# Patient Record
Sex: FEMALE | Race: WHITE | NOT HISPANIC OR LATINO | Employment: FULL TIME | RURAL
[De-identification: names, ages, dates, MRNs, and addresses within clinical notes are randomized per-mention and may not be internally consistent; named-entity substitution may affect disease eponyms.]

---

## 2020-06-22 ENCOUNTER — HISTORICAL (OUTPATIENT)
Dept: ADMINISTRATIVE | Facility: HOSPITAL | Age: 20
End: 2020-06-22

## 2020-06-22 LAB
HBV SURFACE AB SER-ACNC: NORMAL M[IU]/ML
HBV SURFACE AG SERPL QL IA: NORMAL

## 2020-06-24 LAB
VARICELLA ZOSTER, BLOOD: POSITIVE
VZV IGG INDEX: 1.33 (ref 0–0.9)

## 2020-12-07 ENCOUNTER — HISTORICAL (OUTPATIENT)
Dept: ADMINISTRATIVE | Facility: HOSPITAL | Age: 20
End: 2020-12-07

## 2020-12-07 LAB — SARS-COV+SARS-COV-2 AG RESP QL IA.RAPID: POSITIVE

## 2020-12-28 ENCOUNTER — HISTORICAL (OUTPATIENT)
Dept: ADMINISTRATIVE | Facility: HOSPITAL | Age: 20
End: 2020-12-28

## 2023-11-14 ENCOUNTER — HOSPITAL ENCOUNTER (EMERGENCY)
Facility: HOSPITAL | Age: 23
Discharge: HOME | End: 2023-11-14
Payer: MEDICAID

## 2023-11-14 VITALS
HEART RATE: 62 BPM | RESPIRATION RATE: 12 BRPM | BODY MASS INDEX: 27.82 KG/M2 | DIASTOLIC BLOOD PRESSURE: 70 MMHG | OXYGEN SATURATION: 98 % | HEIGHT: 65 IN | WEIGHT: 167 LBS | TEMPERATURE: 98 F | SYSTOLIC BLOOD PRESSURE: 110 MMHG

## 2023-11-14 DIAGNOSIS — R05.9 COUGH: ICD-10-CM

## 2023-11-14 DIAGNOSIS — J02.0 STREP PHARYNGITIS: Primary | ICD-10-CM

## 2023-11-14 LAB
FLUAV AG UPPER RESP QL IA.RAPID: NEGATIVE
FLUBV AG UPPER RESP QL IA.RAPID: NEGATIVE
HCG UR QL IA.RAPID: NEGATIVE
RAPID GROUP A STREP: POSITIVE
SARS-COV-2 RDRP RESP QL NAA+PROBE: NEGATIVE

## 2023-11-14 PROCEDURE — 87804 INFLUENZA ASSAY W/OPTIC: CPT | Performed by: NURSE PRACTITIONER

## 2023-11-14 PROCEDURE — 87635 SARS-COV-2 COVID-19 AMP PRB: CPT | Performed by: NURSE PRACTITIONER

## 2023-11-14 PROCEDURE — 99284 EMERGENCY DEPT VISIT MOD MDM: CPT | Mod: ,,, | Performed by: NURSE PRACTITIONER

## 2023-11-14 PROCEDURE — 87880 STREP A ASSAY W/OPTIC: CPT | Performed by: NURSE PRACTITIONER

## 2023-11-14 PROCEDURE — 99284 EMERGENCY DEPT VISIT MOD MDM: CPT | Mod: 25

## 2023-11-14 PROCEDURE — 81025 URINE PREGNANCY TEST: CPT | Performed by: NURSE PRACTITIONER

## 2023-11-14 PROCEDURE — 99284 PR EMERGENCY DEPT VISIT,LEVEL IV: ICD-10-PCS | Mod: ,,, | Performed by: NURSE PRACTITIONER

## 2023-11-14 RX ORDER — PROMETHAZINE HYDROCHLORIDE AND DEXTROMETHORPHAN HYDROBROMIDE 6.25; 15 MG/5ML; MG/5ML
5 SYRUP ORAL EVERY 6 HOURS PRN
Qty: 118 ML | Refills: 0 | Status: SHIPPED | OUTPATIENT
Start: 2023-11-14 | End: 2023-11-24

## 2023-11-14 RX ORDER — AMOXICILLIN AND CLAVULANATE POTASSIUM 875; 125 MG/1; MG/1
1 TABLET, FILM COATED ORAL EVERY 12 HOURS
Qty: 20 TABLET | Refills: 0 | Status: SHIPPED | OUTPATIENT
Start: 2023-11-14 | End: 2023-11-24

## 2023-11-14 NOTE — ED TRIAGE NOTES
Pt presents to the ED via POV w/ c/o body aches, HA, fever, and cough x 1 day. Pt states that her 3 kids have been both positive for strep as well as flu this week. Pt states she had a 102 fever this morning at 7 am and took tylenol for it.

## 2023-11-14 NOTE — ED PROVIDER NOTES
Encounter Date: 2023       History     Chief Complaint   Patient presents with    Fever    Headache    Cough    Generalized Body Aches     23 yo WF ambulatory to ED with c/o fever 102 this morning, productive cough with green sputum, myalgia. Had diarrhea yesterday - none today. No c/o abdominal pain. Kids have been sick with flu and strep.     The history is provided by the patient.     Review of patient's allergies indicates:   Allergen Reactions    Stadol [butorphanol] Itching     History reviewed. No pertinent past medical history.  Past Surgical History:   Procedure Laterality Date     SECTION      ENDOMETRIAL ABLATION       History reviewed. No pertinent family history.  Social History     Tobacco Use    Smoking status: Never    Smokeless tobacco: Never   Substance Use Topics    Drug use: Yes     Frequency: 3.0 times per week     Types: Marijuana     Review of Systems   Constitutional:  Positive for chills and fever. Negative for appetite change.   HENT:  Negative for congestion, ear pain and sore throat.    Respiratory:  Positive for cough. Negative for shortness of breath and wheezing.    Cardiovascular:  Negative for chest pain, palpitations and leg swelling.   Gastrointestinal:  Positive for diarrhea. Negative for abdominal pain, nausea and vomiting.   Genitourinary:  Negative for dysuria.   Musculoskeletal:  Negative for neck pain and neck stiffness.   Neurological:  Positive for headaches. Negative for dizziness and weakness.   Psychiatric/Behavioral:  Negative for confusion.    All other systems reviewed and are negative.      Physical Exam     Initial Vitals [23 0817]   BP Pulse Resp Temp SpO2   110/70 62 12 98.3 °F (36.8 °C) 98 %      MAP       --         Physical Exam    Nursing note and vitals reviewed.  Constitutional: She appears well-developed and well-nourished.   HENT:   Head: Normocephalic and atraumatic.   Right Ear: External ear normal.   Left Ear: External ear normal.    Nose: Nose normal.   Mouth/Throat: Oropharynx is clear and moist.   Eyes: EOM are normal. Pupils are equal, round, and reactive to light.   Neck: Neck supple.   Normal range of motion.  Cardiovascular:  Normal rate, regular rhythm and normal heart sounds.           Pulmonary/Chest: Breath sounds normal.   Abdominal: Abdomen is soft. Bowel sounds are normal. She exhibits no distension. There is no abdominal tenderness.   Musculoskeletal:         General: Normal range of motion.      Cervical back: Normal range of motion and neck supple.     Neurological: She is alert and oriented to person, place, and time. She has normal strength. GCS score is 15. GCS eye subscore is 4. GCS verbal subscore is 5. GCS motor subscore is 6.   Skin: Skin is warm. Capillary refill takes less than 2 seconds.   Psychiatric: She has a normal mood and affect. Her behavior is normal. Judgment and thought content normal.         Medical Screening Exam   See Full Note    ED Course   Procedures  Labs Reviewed   THROAT SCREEN, RAPID STREP - Abnormal; Notable for the following components:       Result Value    Rapid Group A Strep Positive (*)     All other components within normal limits   RAPID INFLUENZA A/B - Normal   SARS-COV-2 RNA AMPLIFICATION, QUAL - Normal    Narrative:     Negative SARS-CoV results should not be used as the sole basis for treatment or patient management decisions; negative results should be considered in the context of a patient's recent exposures, history and the presene of clinical signs and symptoms consistent with COVID-19.  Negative results should be treated as presumptive and confirmed by molecular assay, if necessary for patient management.   HCG QUALITATIVE URINE - Normal          Imaging Results              X-Ray Chest AP Portable (Final result)  Result time 11/14/23 08:46:03      Final result by Valdo Blake II, MD (11/14/23 08:46:03)                   Impression:      No evidence of cardiopulmonary  disease.      Electronically signed by: Valdo Blake  Date:    11/14/2023  Time:    08:46               Narrative:    EXAMINATION:  XR CHEST AP PORTABLE    CLINICAL HISTORY:  Cough, unspecified    COMPARISON:  None available    TECHNIQUE:  XR CHEST AP PORTABLE    FINDINGS:  The heart and mediastinum are normal in size and configuration.  The pulmonary vascularity is normal in caliber.  No lung infiltrates, effusions, pneumothorax or other abnormality is demonstrated.                                       Medications - No data to display  Medical Decision Making  21 yo WF ambulatory to ED with c/o fever 102 this morning, productive cough with green sputum, myalgia. Had diarrhea yesterday - none today. No c/o abdominal pain. Kids have been sick with flu and strep.     Vitals reviewed  Lab positive for strep  Patient discharged with Augmentin and cough medication  Strict return and follow-up precautions have been given by me personally to the patient/family/caregiver(s).    Data Reviewed/Counseling: I have reviewed the patient's vital signs, nursing notes, and other relevant tests/information. I had a detailed discussion regarding the historical points, exam findings, and any diagnostic results supporting the discharge diagnosis. I also discussed the need for outpatient follow-up and the need to return to the ED if symptoms worsen or if there are any questions or concerns that arise at home.        Amount and/or Complexity of Data Reviewed  Labs: ordered. Decision-making details documented in ED Course.  Radiology: ordered. Decision-making details documented in ED Course.    Risk  Prescription drug management.                               Clinical Impression:   Final diagnoses:  [R05.9] Cough  [J02.0] Strep pharyngitis (Primary)                      Danielle Aparicio, CHRISTY  11/14/23 0900

## 2023-11-14 NOTE — DISCHARGE INSTRUCTIONS
Take all of your antibiotics as prescribed, even if you are feeling better. Drink plenty of fluids. Tylenol and Ibuprofen as needed for fever.

## 2023-11-14 NOTE — Clinical Note
"Hilda"Evans Horn was seen and treated in our emergency department on 11/14/2023.  She may return to work on 11/16/2023.       If you have any questions or concerns, please don't hesitate to call.      Danielle Aparicio, OSCARP"

## 2024-04-13 ENCOUNTER — HOSPITAL ENCOUNTER (EMERGENCY)
Facility: HOSPITAL | Age: 24
Discharge: HOME | End: 2024-04-13
Payer: MEDICAID

## 2024-04-13 VITALS
TEMPERATURE: 98 F | WEIGHT: 177.81 LBS | HEART RATE: 50 BPM | RESPIRATION RATE: 14 BRPM | OXYGEN SATURATION: 97 % | DIASTOLIC BLOOD PRESSURE: 66 MMHG | BODY MASS INDEX: 29.62 KG/M2 | SYSTOLIC BLOOD PRESSURE: 115 MMHG | HEIGHT: 65 IN

## 2024-04-13 DIAGNOSIS — R51.9 FRONTAL HEADACHE: Primary | ICD-10-CM

## 2024-04-13 PROCEDURE — 96374 THER/PROPH/DIAG INJ IV PUSH: CPT

## 2024-04-13 PROCEDURE — 63600175 PHARM REV CODE 636 W HCPCS: Performed by: NURSE PRACTITIONER

## 2024-04-13 PROCEDURE — 99284 EMERGENCY DEPT VISIT MOD MDM: CPT | Mod: ,,, | Performed by: NURSE PRACTITIONER

## 2024-04-13 PROCEDURE — 99285 EMERGENCY DEPT VISIT HI MDM: CPT | Mod: 25

## 2024-04-13 PROCEDURE — 96375 TX/PRO/DX INJ NEW DRUG ADDON: CPT

## 2024-04-13 RX ORDER — KETOROLAC TROMETHAMINE 30 MG/ML
30 INJECTION, SOLUTION INTRAMUSCULAR; INTRAVENOUS
Status: COMPLETED | OUTPATIENT
Start: 2024-04-13 | End: 2024-04-13

## 2024-04-13 RX ORDER — DIPHENHYDRAMINE HYDROCHLORIDE 50 MG/ML
25 INJECTION INTRAMUSCULAR; INTRAVENOUS
Status: COMPLETED | OUTPATIENT
Start: 2024-04-13 | End: 2024-04-13

## 2024-04-13 RX ORDER — PROCHLORPERAZINE EDISYLATE 5 MG/ML
10 INJECTION INTRAMUSCULAR; INTRAVENOUS
Status: COMPLETED | OUTPATIENT
Start: 2024-04-13 | End: 2024-04-13

## 2024-04-13 RX ORDER — ONDANSETRON 4 MG/1
4 TABLET, ORALLY DISINTEGRATING ORAL EVERY 6 HOURS PRN
Qty: 15 TABLET | Refills: 0 | Status: SHIPPED | OUTPATIENT
Start: 2024-04-13

## 2024-04-13 RX ADMIN — PROCHLORPERAZINE EDISYLATE 10 MG: 5 INJECTION INTRAMUSCULAR; INTRAVENOUS at 08:04

## 2024-04-13 RX ADMIN — KETOROLAC TROMETHAMINE 30 MG: 30 INJECTION, SOLUTION INTRAMUSCULAR at 08:04

## 2024-04-13 RX ADMIN — DIPHENHYDRAMINE HYDROCHLORIDE 25 MG: 50 INJECTION INTRAMUSCULAR; INTRAVENOUS at 08:04

## 2024-04-13 NOTE — ED PROVIDER NOTES
Encounter Date: 2024       History     Chief Complaint   Patient presents with    Headache     Since thursday     24 y/o WF presents pov with c/o a headache x 2 days. Has h/o frequent headaches in the past and was schedule for CT of head but didn't follow through. Headache is frontal with pain rating 8/10 and associated nausea. Patient had extensive dental work just prior to onset of headache including extraction of three wisdom teeth and two molars. Patient has been self-treating with Tylenol and Ibuprofen with no relief. Negative for fever/chills. LMP a week ago.        Review of patient's allergies indicates:   Allergen Reactions    Stadol [butorphanol] Itching     No past medical history on file.  Past Surgical History:   Procedure Laterality Date     SECTION      ENDOMETRIAL ABLATION       No family history on file.  Social History     Tobacco Use    Smoking status: Never    Smokeless tobacco: Never   Substance Use Topics    Drug use: Yes     Frequency: 3.0 times per week     Types: Marijuana     Review of Systems   Constitutional:  Negative for chills and fever.   HENT:  Positive for dental problem. Negative for congestion, drooling, ear pain, rhinorrhea, sinus pressure, sinus pain, sore throat and trouble swallowing.    Respiratory: Negative.  Negative for apnea, cough, choking, chest tightness, shortness of breath, wheezing and stridor.    Cardiovascular: Negative.  Negative for chest pain, palpitations and leg swelling.   Gastrointestinal: Negative.    Genitourinary: Negative.    Musculoskeletal: Negative.    Neurological:  Positive for headaches. Negative for dizziness, tremors, seizures, syncope, facial asymmetry, speech difficulty, weakness, light-headedness and numbness.   Psychiatric/Behavioral: Negative.     All other systems reviewed and are negative.      Physical Exam     Initial Vitals [24 0750]   BP Pulse Resp Temp SpO2   129/75 65 16 98 °F (36.7 °C) 98 %      MAP       --          Physical Exam    Nursing note and vitals reviewed.  Constitutional: She appears well-nourished. No distress.   HENT:   Head: Normocephalic.   Right Ear: Tympanic membrane and ear canal normal.   Left Ear: Tympanic membrane and ear canal normal.   Nose: Nose normal. Right sinus exhibits no maxillary sinus tenderness and no frontal sinus tenderness. Left sinus exhibits no maxillary sinus tenderness and no frontal sinus tenderness.   Mouth/Throat: Uvula is midline, oropharynx is clear and moist and mucous membranes are normal.   Eyes: Conjunctivae and EOM are normal. Pupils are equal, round, and reactive to light.   Neck: Neck supple.   Normal range of motion.  Cardiovascular:  Normal rate, regular rhythm, normal heart sounds and intact distal pulses.     Exam reveals no gallop and no friction rub.       No murmur heard.  Pulmonary/Chest: Breath sounds normal. No respiratory distress. She has no wheezes. She has no rhonchi. She has no rales. She exhibits no tenderness.   Abdominal: Abdomen is soft. Bowel sounds are normal.   Musculoskeletal:         General: No tenderness. Normal range of motion.      Cervical back: Normal range of motion and neck supple.     Lymphadenopathy:     She has no cervical adenopathy.   Neurological: She is alert and oriented to person, place, and time. She has normal strength. She displays normal reflexes. No cranial nerve deficit or sensory deficit. GCS score is 15. GCS eye subscore is 4. GCS verbal subscore is 5. GCS motor subscore is 6.   Skin: Skin is warm and dry. Capillary refill takes less than 2 seconds.   Psychiatric: She has a normal mood and affect. Her behavior is normal. Judgment and thought content normal.         Medical Screening Exam   See Full Note    ED Course   Procedures  Labs Reviewed - No data to display       Imaging Results              CT Head Without Contrast (Final result)  Result time 04/13/24 08:43:35      Final result by Gilbert Garcia MD (04/13/24  08:43:35)                   Impression:      No acute intracranial abnormality.      Electronically signed by: Gilbert Garcia  Date:    04/13/2024  Time:    08:43               Narrative:    EXAMINATION:  CT HEAD WITHOUT CONTRAST    CLINICAL HISTORY:  Headache, chronic, new features or increased frequency;    TECHNIQUE:  CT of the head performed without the use of intravenous contrast.  The CT examination was performed using one or more of the following dose reduction techniques: Automated exposure control, adjustment of the mA and kV according to patient's size, use of acute or iterative reconstruction techniques.    COMPARISON:  None.    FINDINGS:  No acute intracranial hemorrhage, mass, infarct, or fluid collection.  Ventricles, sulci, and cisterns appear normal.  No mass effect or midline shift.  Calvarium intact.  Mastoid air cells and paranasal sinuses clear.                                       Medications   diphenhydrAMINE injection 25 mg (25 mg Intravenous Given 4/13/24 0822)   ketorolac injection 30 mg (30 mg Intravenous Given 4/13/24 0822)   prochlorperazine injection Soln 10 mg (10 mg Intravenous Given 4/13/24 0823)     Medical Decision Making  22 y/o WF presents pov with c/o a headache x 2 days. Has h/o frequent headaches in the past and was schedule for CT of head but didn't follow through. Headache is frontal with pain rating 8/10 and associated nausea. Patient had extensive dental work just prior to onset of headache including extraction of three wisdom teeth and two molars. Patient has been self-treating with Tylenol and Ibuprofen with no relief. Negative for fever/chills. LMP a week ago.    Amount and/or Complexity of Data Reviewed  Radiology: ordered.     Details: CT Head: No acute intracranial abnormality.    Risk  Prescription drug management.               ED Course as of 04/13/24 0912   Sat Apr 13, 2024   0907 Patient awakened to physical stimuli. States headache much better. [[place]]      ED Course  User Index  [[place]] Shmuel Marley FNP                           Clinical Impression:   Final diagnoses:  [R51.9] Frontal headache (Primary)        ED Disposition Condition    Discharge Stable          ED Prescriptions       Medication Sig Dispense Start Date End Date Auth. Provider    ondansetron (ZOFRAN-ODT) 4 MG TbDL Take 1 tablet (4 mg total) by mouth every 6 (six) hours as needed (nausea). 15 tablet 4/13/2024 -- Shmuel Marley FNP          Follow-up Information    None          Shmuel Marley FNP  04/13/24 0912

## 2024-04-13 NOTE — ED TRIAGE NOTES
Presents to ed per pov with boyfriend c/o headache since Thursday .reported she had  dental work done Thursday.states she has been taking tylenol and norco for pain with little relief.also c/o nausea.

## 2024-06-12 ENCOUNTER — HOSPITAL ENCOUNTER (EMERGENCY)
Facility: HOSPITAL | Age: 24
Discharge: HOME | End: 2024-06-12

## 2024-06-12 VITALS
OXYGEN SATURATION: 99 % | SYSTOLIC BLOOD PRESSURE: 103 MMHG | WEIGHT: 151 LBS | TEMPERATURE: 98 F | DIASTOLIC BLOOD PRESSURE: 61 MMHG | HEART RATE: 74 BPM | HEIGHT: 65 IN | BODY MASS INDEX: 25.16 KG/M2 | RESPIRATION RATE: 18 BRPM

## 2024-06-12 DIAGNOSIS — S39.012A STRAIN OF LUMBAR REGION, INITIAL ENCOUNTER: Primary | ICD-10-CM

## 2024-06-12 LAB
BILIRUB UR QL STRIP: NEGATIVE
CLARITY UR: CLEAR
COLOR UR: YELLOW
GLUCOSE UR STRIP-MCNC: NEGATIVE MG/DL
HCG UR QL IA.RAPID: NEGATIVE
KETONES UR STRIP-SCNC: NEGATIVE MG/DL
LEUKOCYTE ESTERASE UR QL STRIP: NEGATIVE
NITRITE UR QL STRIP: NEGATIVE
PH UR STRIP: 7 PH UNITS
PROT UR QL STRIP: NEGATIVE
RBC # UR STRIP: NEGATIVE /UL
SP GR UR STRIP: 1.01
UROBILINOGEN UR STRIP-ACNC: 0.2 MG/DL

## 2024-06-12 PROCEDURE — 63600175 PHARM REV CODE 636 W HCPCS: Performed by: NURSE PRACTITIONER

## 2024-06-12 PROCEDURE — 81003 URINALYSIS AUTO W/O SCOPE: CPT | Performed by: NURSE PRACTITIONER

## 2024-06-12 PROCEDURE — 99284 EMERGENCY DEPT VISIT MOD MDM: CPT | Mod: ,,, | Performed by: NURSE PRACTITIONER

## 2024-06-12 PROCEDURE — 96372 THER/PROPH/DIAG INJ SC/IM: CPT | Performed by: NURSE PRACTITIONER

## 2024-06-12 PROCEDURE — 99284 EMERGENCY DEPT VISIT MOD MDM: CPT | Mod: 25

## 2024-06-12 PROCEDURE — 81025 URINE PREGNANCY TEST: CPT | Performed by: NURSE PRACTITIONER

## 2024-06-12 RX ORDER — KETOROLAC TROMETHAMINE 30 MG/ML
60 INJECTION, SOLUTION INTRAMUSCULAR; INTRAVENOUS
Status: COMPLETED | OUTPATIENT
Start: 2024-06-12 | End: 2024-06-12

## 2024-06-12 RX ORDER — TIZANIDINE 4 MG/1
4 TABLET ORAL EVERY 6 HOURS PRN
Qty: 12 TABLET | Refills: 0 | Status: SHIPPED | OUTPATIENT
Start: 2024-06-12 | End: 2024-06-16

## 2024-06-12 RX ORDER — ORPHENADRINE CITRATE 30 MG/ML
60 INJECTION INTRAMUSCULAR; INTRAVENOUS
Status: COMPLETED | OUTPATIENT
Start: 2024-06-12 | End: 2024-06-12

## 2024-06-12 RX ORDER — IBUPROFEN 800 MG/1
800 TABLET ORAL EVERY 6 HOURS PRN
Qty: 20 TABLET | Refills: 0 | Status: SHIPPED | OUTPATIENT
Start: 2024-06-12

## 2024-06-12 RX ADMIN — KETOROLAC TROMETHAMINE 60 MG: 30 INJECTION, SOLUTION INTRAMUSCULAR at 09:06

## 2024-06-12 RX ADMIN — ORPHENADRINE CITRATE 60 MG: 60 INJECTION INTRAMUSCULAR; INTRAVENOUS at 09:06

## 2024-06-12 NOTE — Clinical Note
"Hilda"Evans Horn was seen and treated in our emergency department on 6/12/2024.  She may return to work on 06/17/2024.       If you have any questions or concerns, please don't hesitate to call.      Burak Hampton, FNP"

## 2024-06-13 NOTE — ED PROVIDER NOTES
Encounter Date: 2024       History     Chief Complaint   Patient presents with    Back Pain     Lifting couch     Hurt lower back lifting a couch today    The history is provided by the patient.     Review of patient's allergies indicates:   Allergen Reactions    Stadol [butorphanol] Itching     History reviewed. No pertinent past medical history.  Past Surgical History:   Procedure Laterality Date     SECTION      ENDOMETRIAL ABLATION       No family history on file.  Social History     Tobacco Use    Smoking status: Never    Smokeless tobacco: Never   Substance Use Topics    Drug use: Yes     Frequency: 3.0 times per week     Types: Marijuana     Review of Systems   Musculoskeletal:  Positive for back pain.   All other systems reviewed and are negative.      Physical Exam     Initial Vitals [24]   BP Pulse Resp Temp SpO2   103/61 74 18 98.2 °F (36.8 °C) 99 %      MAP       --         Physical Exam    Constitutional: She appears well-developed and well-nourished.   HENT:   Head: Normocephalic.   Right Ear: External ear normal.   Left Ear: External ear normal.   Eyes: Pupils are equal, round, and reactive to light.   Neck:   Normal range of motion.  Cardiovascular:  Normal rate and regular rhythm.           Pulmonary/Chest: Breath sounds normal.   Abdominal: Abdomen is soft. Bowel sounds are normal.   Musculoskeletal:      Cervical back: Normal range of motion.      Comments: Pain in lower back with ROM     Neurological: She is alert and oriented to person, place, and time.   Skin: Skin is warm.   Psychiatric: She has a normal mood and affect.         Medical Screening Exam   See Full Note    ED Course   Procedures  Labs Reviewed   HCG QUALITATIVE URINE - Normal   URINALYSIS, REFLEX TO URINE CULTURE          Imaging Results    None          Medications   orphenadrine injection 60 mg (has no administration in time range)   ketorolac injection 60 mg (has no administration in time range)      Medical Decision Making  Hurt lower back lifting a couch today    Risk  Prescription drug management.                                      Clinical Impression:   Final diagnoses:  [S39.012A] Strain of lumbar region, initial encounter (Primary)               Burak Hampton, P  06/12/24 2055